# Patient Record
Sex: FEMALE | Race: BLACK OR AFRICAN AMERICAN | NOT HISPANIC OR LATINO | URBAN - METROPOLITAN AREA
[De-identification: names, ages, dates, MRNs, and addresses within clinical notes are randomized per-mention and may not be internally consistent; named-entity substitution may affect disease eponyms.]

---

## 2017-09-01 ENCOUNTER — AMBULATORY - HEALTHEAST (OUTPATIENT)
Dept: MULTI SPECIALTY CLINIC | Facility: CLINIC | Age: 22
End: 2017-09-01

## 2017-09-01 LAB — PAP SMEAR - HIM PATIENT REPORTED: NORMAL

## 2019-02-06 ENCOUNTER — OFFICE VISIT - HEALTHEAST (OUTPATIENT)
Dept: INTERNAL MEDICINE | Facility: CLINIC | Age: 24
End: 2019-02-06

## 2019-02-06 ENCOUNTER — COMMUNICATION - HEALTHEAST (OUTPATIENT)
Dept: TELEHEALTH | Facility: CLINIC | Age: 24
End: 2019-02-06

## 2019-02-06 DIAGNOSIS — Z01.419 NORMAL PELVIC EXAM: ICD-10-CM

## 2019-02-06 ASSESSMENT — MIFFLIN-ST. JEOR: SCORE: 1367.31

## 2019-06-19 ENCOUNTER — COMMUNICATION - HEALTHEAST (OUTPATIENT)
Dept: SCHEDULING | Facility: CLINIC | Age: 24
End: 2019-06-19

## 2019-06-19 ENCOUNTER — COMMUNICATION - HEALTHEAST (OUTPATIENT)
Dept: TELEHEALTH | Facility: CLINIC | Age: 24
End: 2019-06-19

## 2019-06-19 ENCOUNTER — OFFICE VISIT - HEALTHEAST (OUTPATIENT)
Dept: FAMILY MEDICINE | Facility: CLINIC | Age: 24
End: 2019-06-19

## 2019-06-19 DIAGNOSIS — B83.9 HELMINTHIASIS, UNSPECIFIED: ICD-10-CM

## 2019-06-24 ENCOUNTER — AMBULATORY - HEALTHEAST (OUTPATIENT)
Dept: OTHER | Facility: CLINIC | Age: 24
End: 2019-06-24

## 2019-06-28 ENCOUNTER — AMBULATORY - HEALTHEAST (OUTPATIENT)
Dept: LAB | Facility: CLINIC | Age: 24
End: 2019-06-28

## 2019-06-28 DIAGNOSIS — B83.9 HELMINTHIASIS, UNSPECIFIED: ICD-10-CM

## 2019-07-01 LAB
G LAMBLIA AG STL QL IA: NORMAL
O+P STL MICRO: NORMAL

## 2019-07-02 ENCOUNTER — COMMUNICATION - HEALTHEAST (OUTPATIENT)
Dept: FAMILY MEDICINE | Facility: CLINIC | Age: 24
End: 2019-07-02

## 2020-03-27 ENCOUNTER — AMBULATORY - HEALTHEAST (OUTPATIENT)
Dept: FAMILY MEDICINE | Facility: CLINIC | Age: 25
End: 2020-03-27

## 2021-05-29 NOTE — TELEPHONE ENCOUNTER
Triage call:   Found a parasite in her stool-   Worm was 3 inches- was not moving- thinks that she saw 2 worms. Indicates that she passed the worms without stool. Diarrhea and vomiting yesterday after drinking a protein shake. Was in [place] for 2 months back in November- drank the water there- has been seen since but they didn't find anything. Did take a picture of the worm for reference. Initially thought it was a pin worm but patient doesn't think that's the case. Denies weight loss. Would like to be seen in the clinic.     Patient warm transferred to scheduling for appointment. Appointment scheduled for today @ 3:20 pm  with Dr Napier.     Iliana Garcia, RN [facility] Triage/Med Refill 6/19/2019 2:28 PM    Reason for Disposition    Pinworms seen (white thread-like worm about size of a staple, moves)    Patient wants to be seen    Protocols used: XOXEWQPW-M-[id number], NO PROTOCOL AVAILABLE - SICK ADULT-A-[place]

## 2021-05-29 NOTE — PROGRESS NOTES
Assessment/Plan:        Diagnoses and all orders for this visit:    Helminthiasis, unspecified  -     albendazole (ALBENZA) 200 mg tablet; Take 2 tablets (400 mg total) by mouth 2 (two) times a day for 7 days.  Dispense: 28 tablet; Refill: 0  -     pyrantel pamoate 250 mg Chew; Chew 4 tablets once for 1 dose.  Dispense: 4 each; Refill: 0  -     Ova and Parasite, Stool; Future  -     Giardia Detection, Stool; Future  -     Ova and Parasite, Stool; Future  -     Ova and Parasite, Stool; Future  We discussed the possibilities that she may indeed have tapeworm.  I did look at the picture and there is a brownish stringy thing one end is big and the other end is thinner.  We did discuss the possible causes.  Discussed the management, but I will have her provide stool to check for ova and parasites to try to confirm that.  In the meantime I think I will go ahead and treat her, I think that it would be better to do the pyrantel pamoate which is over-the-counter and is once.  I did encourage her to let me know if there is any itching or skin rash following the use.  She will follow-up as needed.        Subjective:    Patient ID: Kaley Silva is a 23 y.o. female.    Kaley Silva 23 years comes in today with concerns of having passed what looks to her as a tapeworm.  She has noted drinking a protein shake that she has had for a long time though not .  She started having abdominal discomfort, and ended up vomiting.  She also did have some diarrhea.  She noted that after having a time where she is really having to have a bowel movement and when she did she passed what looks like a tapeworm.  She did google it and thinks that it is actually a  tapeworm.  She noted having traveled to [place] earlier in the year and did drink some water.  She had abdominal discomfort following that for about some months.  She is not sure if she got the infestation from that or if it is from the protein powder.  Currently still has some  abdominal discomfort, some nausea but she has not been vomiting.  Denied having any rash and has had no anal itch.  She does not think that anyone of her family members has had this in the past.  And she has never had any worm infestation in the past.        The following portions of the patient's history were reviewed and updated as appropriate: allergies, current medications, past family history, past medical history, past social history, past surgical history and problem list.    Review of Systems   Constitutional: Negative.    HENT: Negative.    Gastrointestinal: Positive for abdominal pain and nausea. Negative for constipation, diarrhea and vomiting.   Skin: Negative for rash.   Neurological: Negative for dizziness.     Vitals:    06/19/19 1534   BP: 112/78   Pulse: 88   Temp: 98.5  F (36.9  C)   TempSrc: Oral   SpO2: 99%   Weight: 132 lb 3 oz (60 kg)             Objective:    Physical Exam   Constitutional: She appears well-developed and well-nourished. No distress.   Neck: Normal range of motion.   Cardiovascular: Intact distal pulses.   Pulmonary/Chest: Effort normal.   Abdominal: Soft.

## 2021-05-30 NOTE — TELEPHONE ENCOUNTER
Left message to call back for: Kaley-2nd attempt  Information to relay to patient:      ----- Message from Nickie Napier MD sent at 7/2/2019 12:38 PM CDT -----  Please inform her that the stool sample that was done was negative for any parasites or ova or parasites.  It is possible that where she had passed is not a worm.

## 2021-05-30 NOTE — TELEPHONE ENCOUNTER
----- Message from Nickie Napier MD sent at 7/2/2019 12:38 PM CDT -----  Please inform her that the stool sample that was done was negative for any parasites or ova or parasites.  It is possible that where she had passed is not a worm.

## 2021-06-02 VITALS — BODY MASS INDEX: 20.4 KG/M2 | HEIGHT: 67 IN | WEIGHT: 130 LBS

## 2021-06-03 VITALS — WEIGHT: 132.19 LBS | BODY MASS INDEX: 20.7 KG/M2

## 2021-06-07 NOTE — PROGRESS NOTES
Chart reviewed by Ambulatory Quality and Data team    Please abstract the following data from this visit with this patient into the appropriate field in Epic:    Tests that can be patient reported without a hard copy:    Pap smear done on this date: 9/2017 (approximately), by this group: [facility], results were normal.     Other Tests found in the patient's chart through Chart Review/Care Everywhere:        Note to Abstraction: If this section is blank, no results were found via Chart Review/Care Everywhere.

## 2021-06-23 NOTE — PROGRESS NOTES
"Assessment       1. Normal pelvic exam  Patient is a provided reassurance.  And did advise patient if she finds changes she should certainly seek medical care.  All patient's questions addressed she verbalized understanding and agree with plan.      Plan:     There are no Patient Instructions on file for this visit.  Kaley Silva does not currently have medications on file.  No orders of the defined types were placed in this encounter.  Followup: Return in about 1 year (around 2/6/2020), or if symptoms worsen or fail to improve. earlier if needed.  Subjective:      HPI: Kaley Silva is a 23 y.o. female presents to clinic today requesting a pelvic exam.  Patient reports that she believes there is some additional \"fleshy \"skin on her left vaginal wall.  She reports that this is intermittent and at times it is more prominent than others.  She reports no concern for sexual transmitted infections no vaginal irritation discharge pelvic discomfort.  Last period was 2/3/2019.  She reports no sores or lesions.  She reports that she is practicing abstinence.  Patient states last Pap was 1-1/2 years ago at [facility].  Patient notes no additional concerns.    No past medical history on file.  No past surgical history on file.  Patient has no known allergies.  No outpatient medications prior to visit.     No facility-administered medications prior to visit.      No family history on file.  Social History     Social History Narrative     Not on file     There is no problem list on file for this patient.      Review of Systems  Gen: No fever or fatigue  Eyes: No eye discharge.   ENT: No nasal congestion. No pharyngitis. No otalgia.  Resp: No SOB, cough or wheezing.  GI:No diarrhea, nausea or vomiting. No constipation.  :No dysuria, normal UOP  MS: No joint/bone/muscle tenderness.  Skin: No rashes  Neuro: No headaches. Normal  Lymph/Hematologic: No gland swelling        Objective:     Vitals:    02/06/19 1757 " "  BP: 122/82   Pulse: (!) 102   SpO2: 98%   Weight: 130 lb (59 kg)   Height: 5' 7\" (1.702 m)       Physical Exam:   Gen -Pleasant cooperative female she appears no acute distress she is alert and oriented    HEENT -head is atraumatic normocephalic, eyes the sclera and conjunctiva appear within normal limits.  Orphanages pink and moist.    Lungs -respirations are regular nonlabored  -external appearance normal, no lesions noted vaginal exam reveals no evidence of lesions or sores, skin appears healthy and smooth.  Cervix is visualized no erythema noted.  Negative for discharge.  Bimanual exam reveals no pelvic pain or discomfort.  Skin - clear without rash        No results found.  No results found for this or any previous visit (from the past 240 hour(s)).      Inna Alford, CNP  "